# Patient Record
Sex: FEMALE | Race: WHITE | NOT HISPANIC OR LATINO | Employment: UNEMPLOYED | URBAN - METROPOLITAN AREA
[De-identification: names, ages, dates, MRNs, and addresses within clinical notes are randomized per-mention and may not be internally consistent; named-entity substitution may affect disease eponyms.]

---

## 2024-08-06 ENCOUNTER — TELEPHONE (OUTPATIENT)
Dept: PLASTIC SURGERY | Facility: CLINIC | Age: 54
End: 2024-08-06

## 2024-08-30 ENCOUNTER — CONSULT (OUTPATIENT)
Dept: PLASTIC SURGERY | Facility: CLINIC | Age: 54
End: 2024-08-30
Payer: COMMERCIAL

## 2024-08-30 VITALS
WEIGHT: 176 LBS | TEMPERATURE: 98 F | DIASTOLIC BLOOD PRESSURE: 101 MMHG | HEART RATE: 80 BPM | SYSTOLIC BLOOD PRESSURE: 133 MMHG | HEIGHT: 68 IN | BODY MASS INDEX: 26.67 KG/M2

## 2024-08-30 DIAGNOSIS — N64.59 INVERTED NIPPLE: Primary | ICD-10-CM

## 2024-08-30 DIAGNOSIS — N64.9 ABNORMAL NIPPLE: ICD-10-CM

## 2024-08-30 PROCEDURE — 99205 OFFICE O/P NEW HI 60 MIN: CPT | Performed by: PLASTIC SURGERY

## 2024-08-30 RX ORDER — MULTIVITAMIN
1 TABLET ORAL DAILY
COMMUNITY

## 2024-08-30 RX ORDER — ATORVASTATIN CALCIUM 40 MG/1
40 TABLET, FILM COATED ORAL DAILY
COMMUNITY

## 2024-08-30 RX ORDER — MULTIVIT-MIN/IRON/FOLIC ACID/K 18-600-40
2000 CAPSULE ORAL DAILY
COMMUNITY

## 2024-08-30 RX ORDER — CELECOXIB 200 MG/1
200 CAPSULE ORAL 2 TIMES DAILY
COMMUNITY

## 2024-08-30 RX ORDER — HYDROCHLOROTHIAZIDE 25 MG/1
TABLET ORAL
COMMUNITY

## 2024-08-30 RX ORDER — LOSARTAN POTASSIUM 25 MG/1
25 TABLET ORAL DAILY
COMMUNITY
Start: 2024-03-22

## 2024-08-30 RX ORDER — MELATONIN/PYRIDOXINE HCL (B6) 5 MG-10 MG
1 TABLET,IMMED, EXTENDED RELEASE, BIPHASIC ORAL DAILY
COMMUNITY

## 2024-08-30 RX ORDER — ROSUVASTATIN CALCIUM 5 MG/1
TABLET, COATED ORAL
COMMUNITY
Start: 2024-07-27

## 2024-08-30 RX ORDER — AMOXICILLIN 500 MG
CAPSULE ORAL
COMMUNITY

## 2024-09-03 NOTE — PROGRESS NOTES
Plastic Surgery H&P  [facility]   [address] [place] [address]    Assessment/Plan:    Assessment:   1. Right inverted nipple, grade 3  2. Left nipple enlargement      Plan:  We had a lengthy discussion regarding surgical treatment options. I recommended right breast inverted nipple repair with left nipple reduction for symmetry.  All details of the surgical procedure were discussed at length with the patient including all potential risks, benefits and alternatives.  Risk discussed included, but were not limited to: Infection, bleeding, scarring, continued asymmetry, need for additional procedures, loss of sensation.  She noted her understanding of the plan of care. She had opportunity for questions. All were answered. She desires to proceed. Photo documentation taken at today's visit, We will submit for insurance approval. Once approved, we will work on scheduling. She is welcome to call or return with any additional questions/concerns.     Subjective      Jess Barakat is a 54 y.o. female who presents for evaluation of right breast inverted nipple. She states her right nipple has been inverted for several years. It used to protrude when erectile, but no longer does. She feels her left nipple has enlarged over time, since breast feeding. She has a genetic high risk for breast cancer and is followed at [facility]. She has no new breast concerns and denies any pain, skin changes, palpable lumps, or nipple discharge. Screening breast MRI 2023 was benign. No new FHx of malignancy.     Breast History:  -Menarche: 12  -; age at first term delivery: 31 (son 23 and daughter 18)  -Breastfeeding history: Yes x 6 mos  -OCP Use: H/o past use x 14 years  -LMP: Spotting 2024 (first since starting in Progesterone in Feb)  -Prior Breast Biopsies/Surgery: Yes, left x 1 (benign)  -Breast Density: Category C  -Genetic Testing/Results: 84 gene testing  without pathogenic  mutation. +VUS CHEK2      Past Medical History:   Diagnosis Date   Acute pancreatitis   2000   Asthma   Contusion of knee 5/25/2014   History of penicillin allergy 6/5/2011     Past Surgical History:   Procedure Laterality Date   ANESTH, UPPER GI ENDOSCOPIC PROCS 8/25/2010   ANESTHESIA FOR UPPER GI ENDOSCOPIC PROCEDURES (ERCP OR UPPER GI) performed by PERCY ORTIZ at ENDOSCOPY [facility]   COLONOSCOPY, DIAGNOSTIC (RECTUM) N/A 11/3/2017   COLONOSCOPY FLEXIBLE PROXIMAL DIAGNOSTIC performed by Princess Erwin DO at ENDOSCOPY [facility]   COLPSCPY CERVIX W/LOOP ELECT   1992   HYSTEROSCOPY W/BIOPSY AND/OR POLYPECTOMY W/WO D&C N/A 1/25/2017   HYSTEROSCOPY WITH BIOPSY AND/OR POLYPECTOMY WITH OR WITHOUT D&C performed by Emily Baptiste MD at OR [facility]   MRI GUIDED BREAST BX LEFT Left 05/12/2021   REMOVE CERVIX CONE W/LOOP ELECTRODE N/A 3/2/2016   LOOP ELECTROSURGERY EXCISION PROCEDURE performed by Perry Pate MD at OR [facility]   REMOVE TONSILS & ADENOIDS, UNDER 12   REPAIR INITIAL INGUINAL HERNIA REDUCIBLE AGE 5 OR MORE   AGE 5       Current Outpatient Medications:   •  atorvastatin (LIPITOR) 40 mg tablet, Take 40 mg by mouth daily, Disp: , Rfl:   •  celecoxib (CeleBREX) 200 mg capsule, Take 200 mg by mouth 2 (two) times a day, Disp: , Rfl:   •  Cholecalciferol (Vitamin D) 50 MCG (2000 UT) CAPS, Take 2,000 Units by mouth daily, Disp: , Rfl:   •  Coenzyme Q10 (CoQ-10) 100 MG capsule, Take 1 tablet by mouth daily, Disp: , Rfl:   •  Collagen-Vitamin C-Biotin 500-50-0.8 MG CAPS, Take 2 capsules by mouth daily, Disp: , Rfl:   •  DULoxetine HCl 30 MG CSDR, , Disp: , Rfl:   •  hydroCHLOROthiazide 25 mg tablet, , Disp: , Rfl:   •  Lactobacillus (CULTURELLE WOMENS 4 IN 1 PO), , Disp: , Rfl:   •  losartan (COZAAR) 25 mg tablet, Take 25 mg by mouth daily, Disp: , Rfl:   •  Magnesium Cl-Calcium Carbonate (SLOWMAG MG MUSCLE/HEART PO), , Disp: , Rfl:   •  metroNIDAZOLE (METROCREAM) 0.75 % cream, Apply topically 2 (two) times a day, Disp: ,  "Rfl:   •  Misc Natural Products (OSTEO BI-FLEX ADV JOINT SHIELD PO), , Disp: , Rfl:   •  Multiple Vitamin (multivitamin) tablet, Take 1 tablet by mouth daily, Disp: , Rfl:   •  Multiple Vitamins-Minerals (HAIR SKIN & NAILS ADVANCED PO), , Disp: , Rfl:   •  Multiple Vitamins-Minerals (WOMENS 50+ MULTI VITAMIN/MIN PO), , Disp: , Rfl:   •  Omega-3 Fatty Acids (Fish Oil) 1200 MG CAPS, , Disp: , Rfl:   •  rosuvastatin (CRESTOR) 5 mg tablet, , Disp: , Rfl:       Allergies   Allergen Reactions   • Nsaids Abdominal Pain, GI Bleeding and Other (See Comments)     \"Sever stomach pain. Pancreatitis on one occasion.\" per pain      Other Reaction(s): GI bleeding   • Clindamycin Other (See Comments)     Nausea from anesthesia. ? Facial rash   • Erythromycin Other (See Comments)     \"Thrush in mouth.\" per patient   • Montelukast Other (See Comments)     Nightmare   • Niacin Other (See Comments)     headache   • Nifedipine Other (See Comments)     Leg pain   • Sulfa Antibiotics Hives and Tongue Swelling     \"Thrush in mouth.\" per patient   • Tetracycline Other (See Comments)     \"Thrush in mouth.\" per patient   • Nutritional Supplements Itching and Rash     \"Nutritional supplement for diet plan with high niacin. Headaches.\" per patient   • Other Rash         Family History:  -FMHx Breast Cancer:  Maternal aunt age 65  PGM age 69; also had lung cancer    -FMHx Ovarian Cancer:  Maternal cousin age 43    -FMHx other malignancies:  PGGM: Pancreatic cancer  Paternal Cousin (male): Pancreatic cancer in 20's?     Social History     Socioeconomic History   • Marital status: /Civil Union     Spouse name: Not on file   • Number of children: Not on file   • Years of education: Not on file   • Highest education level: Not on file   Occupational History   • Not on file   Tobacco Use   • Smoking status: Not on file   • Smokeless tobacco: Not on file   Substance and Sexual Activity   • Alcohol use: Not on file   • Drug use: Not on file   • " "Sexual activity: Not on file   Other Topics Concern   • Not on file   Social History Narrative   • Not on file     Social Determinants of Health     Financial Resource Strain: Not on file   Food Insecurity: No Food Insecurity (12/5/2022)    Received from [facility]    Hunger Vital Sign    • Worried About Running Out of Food in the Last Year: Never true    • Ran Out of Food in the Last Year: Never true   Transportation Needs: Not on file   Physical Activity: Not on file   Stress: Not on file   Social Connections: Unknown (6/18/2024)    Received from [facility]    Social Connections    • How often do you feel lonely or isolated from those around you? (Adult - for ages 18 years and over): Not on file   Intimate Partner Violence: Unknown (4/4/2023)    Received from [facility]    • Physically Hurt: Not on file    • Insult or Talk Down To: Not on file    • Threaten Physical Harm: Not on file    • Scream or Curse: Not on file   Housing Stability: Not on file       Review of Systems  Pertinent items are noted in HPI.      Objective     BP (!) 133/101 (BP Location: Left arm, Patient Position: Sitting, Cuff Size: Standard)   Pulse 80   Temp 98 °F (36.7 °C) (Tympanic)   Ht 5' 8\" (1.727 m)   Wt 79.8 kg (176 lb)   BMI 26.76 kg/m²     General:  alert and oriented, in no acute distress   Skin:  normal and no rash or abnormalities   Eyes: conjunctivae/corneas clear. PERRL, EOM's intact. Fundi benign.   Mouth: MMM no lesions   Lymph Nodes:  Cervical, supraclavicular, and axillary nodes normal.   Lungs:  clear to auscultation bilaterally   Heart:  regular rate and rhythm, S1, S2 normal, no murmur, click, rub or gallop and regular rate and rhythm   Abdomen: soft, non-tender; bowel sounds normal; no masses,  no organomegaly   CVA:  absent   Genitourinary: defer exam   Extremities:  extremities normal, warm and well-perfused; no cyanosis, clubbing, or edema   Neurologic:  Alert and oriented x3. Gait normal. Reflexes and " motor strength normal and symmetric. Cranial nerves 2-12 and sensation grossly intact.   Psychiatric:  normal mood, behavior, speech, dress, and thought processes      BREAST: Bilateral breasts examined in the seated position. Breasts are symmetric in shape and contour without dimpling bilaterally. RIGHT: Normal to inspection without skin changes or dimpling. No tenderness or palpable lumps/masses. +Nipple inversion- grade 3, nipple does not protrude. LEFT:  breast is normal to inspection without skin changes. No tenderness, palpable lumps/masses, or nipple discharge/inversion/retraction. Nipple is very large and protuberant, constantly erect, does not retract. Nipple is at least 4x the size of the right nipple.      Screening Breast MRI 9/29/23:  Findings  Left  The left breast has heterogeneous fibroglandular tissue. There is no evidence of suspicious masses, abnormal enhancement, or other abnormal findings in the left breast.    Right  The right breast has heterogeneous fibroglandular tissue. There is no evidence of suspicious masses, abnormal enhancement, or other abnormal findings in the right breast.  There is stable partial inversion of the right nipple on physiological enhancement, unchanged since prior evaluation.  Impression  Bilateral  No magnetic resonance evidence of malignancy.  BI-RADS® Category: 2 - Benign.       A total of 60 mins was spent on the encounter, including reviewing the patient's records, documentation, and time spent in counseling coordination of care which represent greater than 50% of the visit. 35 mins was spent in counseling and discussion of surgical procedures.

## 2024-09-19 ENCOUNTER — TELEPHONE (OUTPATIENT)
Dept: PLASTIC SURGERY | Facility: CLINIC | Age: 54
End: 2024-09-19

## 2024-09-19 NOTE — TELEPHONE ENCOUNTER
Left message for patient letting her know that I faxed the information to her insurance for approval. Once a decision has been received, I would give her a call to let her know and set up surgery. Left my direct number for her to call me with any questions.